# Patient Record
(demographics unavailable — no encounter records)

---

## 2025-01-07 NOTE — HEALTH RISK ASSESSMENT
[No] : In the past 12 months have you used drugs other than those required for medical reasons? No [0] : 2) Feeling down, depressed, or hopeless: Not at all (0) [PHQ-2 Negative - No further assessment needed] : PHQ-2 Negative - No further assessment needed [Former] : Former [[id number]] : 0

## 2025-01-07 NOTE — HEALTH RISK ASSESSMENT
[No] : In the past 12 months have you used drugs other than those required for medical reasons? No [0] : 2) Feeling down, depressed, or hopeless: Not at all (0) [PHQ-2 Negative - No further assessment needed] : PHQ-2 Negative - No further assessment needed [Former] : Former [[id number]] : 0

## 2025-01-07 NOTE — PHYSICAL EXAM
[Prostate Tenderness] : the prostate was not tender [No Prostate Nodules] : no prostate nodules [Normal] : no posterior cervical lymphadenopathy and no anterior cervical lymphadenopathy

## 2025-01-07 NOTE — HISTORY OF PRESENT ILLNESS
[FreeTextEntry1] : Patient presents today for longitudinal care. [de-identified] : Patient is a 56yr old male who presents today for longitudinal care.  Patient is doing well overall. Patient reports following with [facility]. Reports following up after stress test, everything normal. Reports being put on beta blocker. Pt reports diet is generally healthy but denies being as active. Confirms CT scan done last year, stable. 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 Reports UTD with colonoscopy. Pt also reports frequent urination, reports drinking tea and two cups of coffee daily. Denies burning on urination. Denies sleeping very well due to fluctuating shifts. Discussed Shingles vaccine. Reports checking BP at home, generally stable. Reports high reading before doing ECHO.   Prostate exam, oral consent received, examined. Denies any CP, chest tightness or SOB. Denies any abdominal pain, urinary symptom, or change in bowel habits. Denies any fever, chills, or night sweats.

## 2025-01-07 NOTE — HISTORY OF PRESENT ILLNESS
[FreeTextEntry1] : Patient presents today for longitudinal care. [de-identified] : Patient is a 56yr old male who presents today for longitudinal care.  Patient is doing well overall. Patient reports following with [facility]. Reports following up after stress test, everything normal. Reports being put on beta blocker. Pt reports diet is generally healthy but denies being as active. Confirms CT scan done last year, stable. 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 Reports UTD with colonoscopy. Pt also reports frequent urination, reports drinking tea and two cups of coffee daily. Denies burning on urination. Denies sleeping very well due to fluctuating shifts. Discussed Shingles vaccine. Reports checking BP at home, generally stable. Reports high reading before doing ECHO.   Prostate exam, oral consent received, examined. Denies any CP, chest tightness or SOB. Denies any abdominal pain, urinary symptom, or change in bowel habits. Denies any fever, chills, or night sweats.

## 2025-01-07 NOTE — ASSESSMENT
[FreeTextEntry1] : Follow up visit: Former smoker,  -BP is stable. Continue current management. - Check A1c. Lipid panel, vitamin levels, urine analysis, TSH, PSA Total - Order Tobacco Use Screening, CT Chest Lung Cancer Screening   - RTO in 3 months   Pt verbalized understanding and will reach should any questions/concerns occur.

## 2025-01-07 NOTE — ADDENDUM
[FreeTextEntry1] : I, Michael Mclean, acted as a scribe on behalf of Dr. Eric Castillo MD, on 01/06/2025.   All medical entries made by the scribe were at my, Dr. Eric Castillo MD, direction and personally dictated by me on 01/06/2025. I have reviewed the chart and agree that the record accurately reflects my personal performance of the history, physical exam, assessment and plan. I have also personally directed, reviewed, and agreed with the chart.

## 2025-03-26 NOTE — HISTORY OF PRESENT ILLNESS
[Home] : at home, [unfilled] , at the time of the visit. [Medical Office: ([facility])___] : at the medical office located in  [Telephone (audio)] : This telephonic visit was provided via audio only technology. [Patient preference] : patient preference. [Verbal consent obtained from patient] : the patient, [unfilled] [FreeTextEntry8] : Telephonic visit done as patient has been having some anxiety.  Recently got laid off from work.  Has been having increased stress secondary to the circumstances.  Previously was given Ativan by other physician and tolerated well.  Requesting medication.  Does have some difficulty sleeping, denies any alcohol use or drug use.  Denies any thoughts of self harm

## 2025-03-26 NOTE — ASSESSMENT
[FreeTextEntry1] : Ativan given previously tolerated medication without side effects.  Did advise on the potential abuse and dependence.  Advised to take only as needed.  Patient verbalized plan to take only as needed and not daily.  Denied any thoughts of self-harm, alcohol use or drug use.

## 2025-04-07 NOTE — HISTORY OF PRESENT ILLNESS
[FreeTextEntry1] : 57-year-old female ex-smoker HTN, HLD whose BP was elevated on stress and we started nebivolol when could not tolerate HCTZ. Just got laid off, father dieing and marital issues. Dtr anorexia but getting better. Trying to lose weight. Cut carbs. Now preDM.

## 2025-04-07 NOTE — DISCUSSION/SUMMARY
[FreeTextEntry1] : The patient is a 57-year-old female ex-smoker HTN, HLD, preDM whose BP is better. #1 CV- ECG LVH, ECHO and Exercise stress test HTN, low threshold cardiac ct #2 HTN- c/w Olmesartan 40mg, amlodipine 5mg, nebivolol 5mg  #3 HLD- Mediterranean diet for now, consider statin pending results of above.  #4 General-works as  [place] now laid off, stress with family issues. Trying to get 10,000 steps a day and motivated to lose weight. [EKG obtained to assist in diagnosis and management of assessed problem(s)] : EKG obtained to assist in diagnosis and management of assessed problem(s)